# Patient Record
Sex: FEMALE | URBAN - METROPOLITAN AREA
[De-identification: names, ages, dates, MRNs, and addresses within clinical notes are randomized per-mention and may not be internally consistent; named-entity substitution may affect disease eponyms.]

---

## 2017-12-07 ENCOUNTER — IMPORTED ENCOUNTER (OUTPATIENT)
Dept: URBAN - METROPOLITAN AREA CLINIC 1 | Facility: CLINIC | Age: 42
End: 2017-12-07

## 2017-12-07 PROBLEM — H52.13: Noted: 2017-12-07

## 2017-12-07 PROCEDURE — 92004 COMPRE OPH EXAM NEW PT 1/>: CPT

## 2017-12-07 PROCEDURE — 92015 DETERMINE REFRACTIVE STATE: CPT

## 2017-12-07 NOTE — PATIENT DISCUSSION
1. Myopia: Rx was given for correction if indicated and requested. ** DMV Form completed and VF 60-4 and 30-2 performed today. See attachments. 2.  Return for an appointment in 1 year for 40. with Dr. Barry Saavedra.

## 2022-04-02 ASSESSMENT — VISUAL ACUITY
OD_SC: J1+
OS_SC: J1+
OD_CC: 20/100+1
OS_CC: 20/200

## 2022-04-02 ASSESSMENT — TONOMETRY
OS_IOP_MMHG: 15
OD_IOP_MMHG: 15

## 2023-01-31 RX ORDER — ONDANSETRON 4 MG/1
4 TABLET, ORALLY DISINTEGRATING ORAL EVERY 8 HOURS PRN
COMMUNITY
Start: 2021-08-23

## 2023-01-31 RX ORDER — CYCLOBENZAPRINE HCL 10 MG
TABLET ORAL
COMMUNITY
Start: 2019-04-04

## 2023-01-31 RX ORDER — TOLTERODINE 4 MG/1
4 CAPSULE, EXTENDED RELEASE ORAL DAILY
COMMUNITY
Start: 2019-04-15

## 2023-01-31 RX ORDER — AMOXICILLIN 500 MG/1
CAPSULE ORAL
COMMUNITY
Start: 2019-04-04

## 2023-01-31 RX ORDER — METHOCARBAMOL 500 MG/1
1000 TABLET, FILM COATED ORAL 4 TIMES DAILY
COMMUNITY
Start: 2018-08-27